# Patient Record
Sex: FEMALE | Race: WHITE | NOT HISPANIC OR LATINO | Employment: STUDENT | URBAN - METROPOLITAN AREA
[De-identification: names, ages, dates, MRNs, and addresses within clinical notes are randomized per-mention and may not be internally consistent; named-entity substitution may affect disease eponyms.]

---

## 2021-10-21 ENCOUNTER — OFFICE VISIT (OUTPATIENT)
Dept: FAMILY MEDICINE CLINIC | Facility: CLINIC | Age: 21
End: 2021-10-21

## 2021-10-21 ENCOUNTER — LAB (OUTPATIENT)
Dept: LAB | Facility: HOSPITAL | Age: 21
End: 2021-10-21

## 2021-10-21 VITALS
HEIGHT: 67 IN | SYSTOLIC BLOOD PRESSURE: 108 MMHG | HEART RATE: 94 BPM | OXYGEN SATURATION: 97 % | RESPIRATION RATE: 18 BRPM | DIASTOLIC BLOOD PRESSURE: 60 MMHG | TEMPERATURE: 97.1 F | WEIGHT: 150.6 LBS | BODY MASS INDEX: 23.64 KG/M2

## 2021-10-21 DIAGNOSIS — Z13.29 SCREENING FOR ENDOCRINE DISORDER: ICD-10-CM

## 2021-10-21 DIAGNOSIS — R74.8 ELEVATED LIVER ENZYMES: ICD-10-CM

## 2021-10-21 DIAGNOSIS — Z13.0 SCREENING FOR DEFICIENCY ANEMIA: ICD-10-CM

## 2021-10-21 DIAGNOSIS — L70.0 ACNE VULGARIS: ICD-10-CM

## 2021-10-21 DIAGNOSIS — G43.009 MIGRAINE WITHOUT AURA AND WITHOUT STATUS MIGRAINOSUS, NOT INTRACTABLE: ICD-10-CM

## 2021-10-21 DIAGNOSIS — R53.83 FATIGUE, UNSPECIFIED TYPE: Primary | ICD-10-CM

## 2021-10-21 DIAGNOSIS — R11.2 NON-INTRACTABLE VOMITING WITH NAUSEA, UNSPECIFIED VOMITING TYPE: ICD-10-CM

## 2021-10-21 DIAGNOSIS — M25.331 WRIST JOINT INSTABILITY, RIGHT: ICD-10-CM

## 2021-10-21 DIAGNOSIS — Z30.41 ORAL CONTRACEPTIVE PILL SURVEILLANCE: ICD-10-CM

## 2021-10-21 PROBLEM — Z86.59 HISTORY OF DEPRESSION: Status: ACTIVE | Noted: 2021-10-21

## 2021-10-21 PROBLEM — E55.9 VITAMIN D DEFICIENCY: Status: ACTIVE | Noted: 2021-10-21

## 2021-10-21 PROCEDURE — 99204 OFFICE O/P NEW MOD 45 MIN: CPT | Performed by: FAMILY MEDICINE

## 2021-10-21 RX ORDER — DOXYCYCLINE HYCLATE 100 MG/1
100 CAPSULE ORAL 2 TIMES DAILY
COMMUNITY
End: 2021-11-03

## 2021-10-21 RX ORDER — SPIRONOLACTONE 100 MG/1
100 TABLET, FILM COATED ORAL DAILY
COMMUNITY
End: 2022-10-06

## 2021-10-21 RX ORDER — SUMATRIPTAN 100 MG/1
100 TABLET, FILM COATED ORAL
COMMUNITY

## 2021-10-21 RX ORDER — LEVONORGESTREL / ETHINYL ESTRADIOL AND ETHINYL ESTRADIOL 150-30(84)
1 KIT ORAL DAILY
COMMUNITY
End: 2021-11-05

## 2021-10-21 RX ORDER — TRETINOIN 0.5 MG/G
1 CREAM TOPICAL NIGHTLY
COMMUNITY

## 2021-10-22 ENCOUNTER — LAB (OUTPATIENT)
Dept: LAB | Facility: HOSPITAL | Age: 21
End: 2021-10-22

## 2021-10-22 DIAGNOSIS — R53.83 FATIGUE, UNSPECIFIED TYPE: ICD-10-CM

## 2021-10-22 DIAGNOSIS — Z13.0 SCREENING FOR DEFICIENCY ANEMIA: ICD-10-CM

## 2021-10-22 DIAGNOSIS — Z13.29 SCREENING FOR ENDOCRINE DISORDER: ICD-10-CM

## 2021-10-22 DIAGNOSIS — R74.8 ELEVATED LIVER ENZYMES: ICD-10-CM

## 2021-10-22 DIAGNOSIS — R11.2 NON-INTRACTABLE VOMITING WITH NAUSEA, UNSPECIFIED VOMITING TYPE: ICD-10-CM

## 2021-10-22 LAB
B-HCG UR QL: NEGATIVE
BACTERIA UR QL AUTO: ABNORMAL /HPF
BASOPHILS # BLD AUTO: 0.03 10*3/MM3 (ref 0–0.2)
BASOPHILS NFR BLD AUTO: 0.4 % (ref 0–1.5)
BILIRUB UR QL STRIP: NEGATIVE
CLARITY UR: CLEAR
COLOR UR: YELLOW
DEPRECATED RDW RBC AUTO: 41.5 FL (ref 37–54)
EOSINOPHIL # BLD AUTO: 0.13 10*3/MM3 (ref 0–0.4)
EOSINOPHIL NFR BLD AUTO: 1.9 % (ref 0.3–6.2)
ERYTHROCYTE [DISTWIDTH] IN BLOOD BY AUTOMATED COUNT: 13.6 % (ref 12.3–15.4)
GLUCOSE UR STRIP-MCNC: NEGATIVE MG/DL
HCT VFR BLD AUTO: 40.7 % (ref 34–46.6)
HGB BLD-MCNC: 13.1 G/DL (ref 12–15.9)
HGB UR QL STRIP.AUTO: ABNORMAL
HYALINE CASTS UR QL AUTO: ABNORMAL /LPF
IMM GRANULOCYTES # BLD AUTO: 0.02 10*3/MM3 (ref 0–0.05)
IMM GRANULOCYTES NFR BLD AUTO: 0.3 % (ref 0–0.5)
KETONES UR QL STRIP: NEGATIVE
LEUKOCYTE ESTERASE UR QL STRIP.AUTO: ABNORMAL
LYMPHOCYTES # BLD AUTO: 1.31 10*3/MM3 (ref 0.7–3.1)
LYMPHOCYTES NFR BLD AUTO: 18.7 % (ref 19.6–45.3)
MCH RBC QN AUTO: 27.1 PG (ref 26.6–33)
MCHC RBC AUTO-ENTMCNC: 32.2 G/DL (ref 31.5–35.7)
MCV RBC AUTO: 84.1 FL (ref 79–97)
MONOCYTES # BLD AUTO: 0.37 10*3/MM3 (ref 0.1–0.9)
MONOCYTES NFR BLD AUTO: 5.3 % (ref 5–12)
NEUTROPHILS NFR BLD AUTO: 5.15 10*3/MM3 (ref 1.7–7)
NEUTROPHILS NFR BLD AUTO: 73.4 % (ref 42.7–76)
NITRITE UR QL STRIP: NEGATIVE
NRBC BLD AUTO-RTO: 0 /100 WBC (ref 0–0.2)
PH UR STRIP.AUTO: 5.5 [PH] (ref 5–8)
PLATELET # BLD AUTO: 380 10*3/MM3 (ref 140–450)
PMV BLD AUTO: 9.6 FL (ref 6–12)
PROT UR QL STRIP: NEGATIVE
RBC # BLD AUTO: 4.84 10*6/MM3 (ref 3.77–5.28)
RBC # UR: ABNORMAL /HPF
REF LAB TEST METHOD: ABNORMAL
SP GR UR STRIP: 1.02 (ref 1–1.03)
SQUAMOUS #/AREA URNS HPF: ABNORMAL /HPF
UROBILINOGEN UR QL STRIP: ABNORMAL
WBC # BLD AUTO: 7.01 10*3/MM3 (ref 3.4–10.8)
WBC UR QL AUTO: ABNORMAL /HPF
YEAST URNS QL MICRO: ABNORMAL /HPF

## 2021-10-22 PROCEDURE — 80053 COMPREHEN METABOLIC PANEL: CPT

## 2021-10-22 PROCEDURE — 82306 VITAMIN D 25 HYDROXY: CPT

## 2021-10-22 PROCEDURE — 82150 ASSAY OF AMYLASE: CPT

## 2021-10-22 PROCEDURE — 81025 URINE PREGNANCY TEST: CPT

## 2021-10-22 PROCEDURE — 86038 ANTINUCLEAR ANTIBODIES: CPT

## 2021-10-22 PROCEDURE — 80061 LIPID PANEL: CPT

## 2021-10-22 PROCEDURE — 84439 ASSAY OF FREE THYROXINE: CPT

## 2021-10-22 PROCEDURE — 82390 ASSAY OF CERULOPLASMIN: CPT

## 2021-10-22 PROCEDURE — 83036 HEMOGLOBIN GLYCOSYLATED A1C: CPT

## 2021-10-22 PROCEDURE — 82607 VITAMIN B-12: CPT

## 2021-10-22 PROCEDURE — 84443 ASSAY THYROID STIM HORMONE: CPT

## 2021-10-22 PROCEDURE — 84100 ASSAY OF PHOSPHORUS: CPT

## 2021-10-22 PROCEDURE — 81001 URINALYSIS AUTO W/SCOPE: CPT

## 2021-10-22 PROCEDURE — 82728 ASSAY OF FERRITIN: CPT

## 2021-10-22 PROCEDURE — 85025 COMPLETE CBC W/AUTO DIFF WBC: CPT

## 2021-10-22 PROCEDURE — 86141 C-REACTIVE PROTEIN HS: CPT

## 2021-10-23 LAB
25(OH)D3 SERPL-MCNC: 35.6 NG/ML
ALBUMIN SERPL-MCNC: 4.2 G/DL (ref 3.5–5.2)
ALBUMIN/GLOB SERPL: 1.4 G/DL
ALP SERPL-CCNC: 85 U/L (ref 39–117)
ALT SERPL W P-5'-P-CCNC: 14 U/L (ref 1–33)
AMYLASE SERPL-CCNC: 99 U/L (ref 28–100)
ANION GAP SERPL CALCULATED.3IONS-SCNC: 14.8 MMOL/L (ref 5–15)
AST SERPL-CCNC: 24 U/L (ref 1–32)
BILIRUB SERPL-MCNC: 0.2 MG/DL (ref 0–1.2)
BUN SERPL-MCNC: 10 MG/DL (ref 6–20)
BUN/CREAT SERPL: 12.5 (ref 7–25)
CALCIUM SPEC-SCNC: 9.3 MG/DL (ref 8.6–10.5)
CERULOPLASMIN SERPL-MCNC: 52 MG/DL (ref 19–39)
CHLORIDE SERPL-SCNC: 105 MMOL/L (ref 98–107)
CHOLEST SERPL-MCNC: 226 MG/DL (ref 0–200)
CO2 SERPL-SCNC: 19.2 MMOL/L (ref 22–29)
CREAT SERPL-MCNC: 0.8 MG/DL (ref 0.57–1)
CRP SERPL-MCNC: 0.41 MG/DL (ref 0.01–0.5)
FERRITIN SERPL-MCNC: 17.7 NG/ML (ref 13–150)
GFR SERPL CREATININE-BSD FRML MDRD: 91 ML/MIN/1.73
GLOBULIN UR ELPH-MCNC: 3.1 GM/DL
GLUCOSE SERPL-MCNC: 76 MG/DL (ref 65–99)
HBA1C MFR BLD: 5.44 % (ref 4.8–5.6)
HDLC SERPL-MCNC: 60 MG/DL (ref 40–60)
LDLC SERPL CALC-MCNC: 142 MG/DL (ref 0–100)
LDLC/HDLC SERPL: 2.32 {RATIO}
PHOSPHATE SERPL-MCNC: 2.9 MG/DL (ref 2.5–4.5)
POTASSIUM SERPL-SCNC: 4.3 MMOL/L (ref 3.5–5.2)
PROT SERPL-MCNC: 7.3 G/DL (ref 6–8.5)
SODIUM SERPL-SCNC: 139 MMOL/L (ref 136–145)
T4 FREE SERPL-MCNC: 1.5 NG/DL (ref 0.93–1.7)
TRIGL SERPL-MCNC: 134 MG/DL (ref 0–150)
TSH SERPL DL<=0.05 MIU/L-ACNC: 1.49 UIU/ML (ref 0.27–4.2)
VIT B12 BLD-MCNC: 425 PG/ML (ref 211–946)
VLDLC SERPL-MCNC: 24 MG/DL (ref 5–40)

## 2021-10-25 LAB — ANA SER QL: NEGATIVE

## 2021-11-03 ENCOUNTER — OFFICE VISIT (OUTPATIENT)
Dept: OBSTETRICS AND GYNECOLOGY | Facility: CLINIC | Age: 21
End: 2021-11-03

## 2021-11-03 VITALS
DIASTOLIC BLOOD PRESSURE: 78 MMHG | WEIGHT: 149 LBS | SYSTOLIC BLOOD PRESSURE: 110 MMHG | HEIGHT: 67 IN | BODY MASS INDEX: 23.39 KG/M2

## 2021-11-03 DIAGNOSIS — Z01.419 PAP TEST, AS PART OF ROUTINE GYNECOLOGICAL EXAMINATION: Primary | ICD-10-CM

## 2021-11-03 DIAGNOSIS — Z30.014 ENCOUNTER FOR INITIAL PRESCRIPTION OF INTRAUTERINE CONTRACEPTIVE DEVICE (IUD): ICD-10-CM

## 2021-11-03 PROCEDURE — 99385 PREV VISIT NEW AGE 18-39: CPT | Performed by: OBSTETRICS & GYNECOLOGY

## 2021-11-05 ENCOUNTER — OFFICE VISIT (OUTPATIENT)
Dept: FAMILY MEDICINE CLINIC | Facility: CLINIC | Age: 21
End: 2021-11-05

## 2021-11-05 VITALS
OXYGEN SATURATION: 98 % | HEART RATE: 82 BPM | BODY MASS INDEX: 23.76 KG/M2 | TEMPERATURE: 97.1 F | RESPIRATION RATE: 18 BRPM | DIASTOLIC BLOOD PRESSURE: 60 MMHG | HEIGHT: 67 IN | WEIGHT: 151.4 LBS | SYSTOLIC BLOOD PRESSURE: 110 MMHG

## 2021-11-05 DIAGNOSIS — R53.83 FATIGUE, UNSPECIFIED TYPE: Primary | ICD-10-CM

## 2021-11-05 DIAGNOSIS — R74.8 ELEVATED LIVER ENZYMES: ICD-10-CM

## 2021-11-05 PROBLEM — Z30.8 ENCOUNTER FOR OTHER CONTRACEPTIVE MANAGEMENT: Status: ACTIVE | Noted: 2021-10-21

## 2021-11-05 PROCEDURE — 99213 OFFICE O/P EST LOW 20 MIN: CPT | Performed by: FAMILY MEDICINE

## 2021-11-08 ENCOUNTER — LAB (OUTPATIENT)
Dept: LAB | Facility: HOSPITAL | Age: 21
End: 2021-11-08

## 2021-11-08 DIAGNOSIS — R74.8 ELEVATED LIVER ENZYMES: ICD-10-CM

## 2021-11-08 DIAGNOSIS — Z01.419 PAP TEST, AS PART OF ROUTINE GYNECOLOGICAL EXAMINATION: ICD-10-CM

## 2021-11-08 LAB
ALBUMIN SERPL-MCNC: 3.9 G/DL (ref 3.5–5.2)
ALP SERPL-CCNC: 94 U/L (ref 39–117)
ALT SERPL W P-5'-P-CCNC: 17 U/L (ref 1–33)
AST SERPL-CCNC: 24 U/L (ref 1–32)
BILIRUB CONJ SERPL-MCNC: <0.2 MG/DL (ref 0–0.3)
BILIRUB INDIRECT SERPL-MCNC: NORMAL MG/DL
BILIRUB SERPL-MCNC: 0.3 MG/DL (ref 0–1.2)
CERULOPLASMIN SERPL-MCNC: 36 MG/DL (ref 19–39)
PROT SERPL-MCNC: 7.2 G/DL (ref 6–8.5)

## 2021-11-08 PROCEDURE — 82390 ASSAY OF CERULOPLASMIN: CPT

## 2021-11-08 PROCEDURE — 80076 HEPATIC FUNCTION PANEL: CPT

## 2021-11-11 ENCOUNTER — DOCUMENTATION (OUTPATIENT)
Dept: OBSTETRICS AND GYNECOLOGY | Facility: CLINIC | Age: 21
End: 2021-11-11

## 2022-08-15 ENCOUNTER — TELEPHONE (OUTPATIENT)
Dept: FAMILY MEDICINE CLINIC | Facility: CLINIC | Age: 22
End: 2022-08-15

## 2022-08-15 DIAGNOSIS — Z11.1 SCREENING FOR TUBERCULOSIS: Primary | ICD-10-CM

## 2022-10-06 ENCOUNTER — LAB (OUTPATIENT)
Dept: LAB | Facility: HOSPITAL | Age: 22
End: 2022-10-06

## 2022-10-06 ENCOUNTER — OFFICE VISIT (OUTPATIENT)
Dept: FAMILY MEDICINE CLINIC | Facility: CLINIC | Age: 22
End: 2022-10-06

## 2022-10-06 ENCOUNTER — HOSPITAL ENCOUNTER (OUTPATIENT)
Dept: GENERAL RADIOLOGY | Facility: HOSPITAL | Age: 22
Discharge: HOME | End: 2022-10-06

## 2022-10-06 VITALS
WEIGHT: 139 LBS | HEART RATE: 94 BPM | DIASTOLIC BLOOD PRESSURE: 60 MMHG | TEMPERATURE: 98 F | SYSTOLIC BLOOD PRESSURE: 80 MMHG | BODY MASS INDEX: 21.82 KG/M2 | OXYGEN SATURATION: 99 %

## 2022-10-06 DIAGNOSIS — R05.1 ACUTE COUGH: ICD-10-CM

## 2022-10-06 DIAGNOSIS — I95.9 HYPOTENSION, UNSPECIFIED HYPOTENSION TYPE: ICD-10-CM

## 2022-10-06 DIAGNOSIS — Z11.1 SCREENING FOR TUBERCULOSIS: ICD-10-CM

## 2022-10-06 DIAGNOSIS — R05.1 ACUTE COUGH: Primary | ICD-10-CM

## 2022-10-06 LAB — D-LACTATE SERPL-SCNC: 1.4 MMOL/L (ref 0.5–2)

## 2022-10-06 PROCEDURE — 87040 BLOOD CULTURE FOR BACTERIA: CPT

## 2022-10-06 PROCEDURE — 71046 X-RAY EXAM CHEST 2 VIEWS: CPT

## 2022-10-06 PROCEDURE — 83605 ASSAY OF LACTIC ACID: CPT

## 2022-10-06 PROCEDURE — 99214 OFFICE O/P EST MOD 30 MIN: CPT | Performed by: FAMILY MEDICINE

## 2022-10-06 PROCEDURE — 36415 COLL VENOUS BLD VENIPUNCTURE: CPT

## 2022-10-06 PROCEDURE — 85025 COMPLETE CBC W/AUTO DIFF WBC: CPT

## 2022-10-06 PROCEDURE — 86480 TB TEST CELL IMMUN MEASURE: CPT

## 2022-10-06 PROCEDURE — 80053 COMPREHEN METABOLIC PANEL: CPT

## 2022-10-06 RX ORDER — DOXYCYCLINE 100 MG/1
100 TABLET ORAL 2 TIMES DAILY
Qty: 20 TABLET | Refills: 0 | Status: SHIPPED | OUTPATIENT
Start: 2022-10-06 | End: 2022-10-16

## 2022-10-06 RX ORDER — BROMPHENIRAMINE MALEATE, PSEUDOEPHEDRINE HYDROCHLORIDE, AND DEXTROMETHORPHAN HYDROBROMIDE 2; 30; 10 MG/5ML; MG/5ML; MG/5ML
5 SYRUP ORAL 4 TIMES DAILY PRN
Qty: 118 ML | Refills: 1 | Status: SHIPPED | OUTPATIENT
Start: 2022-10-06 | End: 2022-10-13

## 2022-10-07 LAB
ALBUMIN SERPL-MCNC: 4.3 G/DL (ref 3.5–5.2)
ALBUMIN/GLOB SERPL: 1.5 G/DL
ALP SERPL-CCNC: 82 U/L (ref 39–117)
ALT SERPL W P-5'-P-CCNC: 12 U/L (ref 1–33)
ANION GAP SERPL CALCULATED.3IONS-SCNC: 9.8 MMOL/L (ref 5–15)
AST SERPL-CCNC: 20 U/L (ref 1–32)
BASOPHILS # BLD AUTO: 0.04 10*3/MM3 (ref 0–0.2)
BASOPHILS NFR BLD AUTO: 0.6 % (ref 0–1.5)
BILIRUB SERPL-MCNC: 0.3 MG/DL (ref 0–1.2)
BUN SERPL-MCNC: 11 MG/DL (ref 6–20)
BUN/CREAT SERPL: 13.6 (ref 7–25)
CALCIUM SPEC-SCNC: 9.9 MG/DL (ref 8.6–10.5)
CHLORIDE SERPL-SCNC: 101 MMOL/L (ref 98–107)
CO2 SERPL-SCNC: 28.2 MMOL/L (ref 22–29)
CREAT SERPL-MCNC: 0.81 MG/DL (ref 0.57–1)
DEPRECATED RDW RBC AUTO: 38.2 FL (ref 37–54)
EGFRCR SERPLBLD CKD-EPI 2021: 105.4 ML/MIN/1.73
EOSINOPHIL # BLD AUTO: 0.18 10*3/MM3 (ref 0–0.4)
EOSINOPHIL NFR BLD AUTO: 2.8 % (ref 0.3–6.2)
ERYTHROCYTE [DISTWIDTH] IN BLOOD BY AUTOMATED COUNT: 12.8 % (ref 12.3–15.4)
GLOBULIN UR ELPH-MCNC: 2.9 GM/DL
GLUCOSE SERPL-MCNC: 83 MG/DL (ref 65–99)
HCT VFR BLD AUTO: 36.7 % (ref 34–46.6)
HGB BLD-MCNC: 12.5 G/DL (ref 12–15.9)
IMM GRANULOCYTES # BLD AUTO: 0.03 10*3/MM3 (ref 0–0.05)
IMM GRANULOCYTES NFR BLD AUTO: 0.5 % (ref 0–0.5)
LYMPHOCYTES # BLD AUTO: 1.37 10*3/MM3 (ref 0.7–3.1)
LYMPHOCYTES NFR BLD AUTO: 20.9 % (ref 19.6–45.3)
MCH RBC QN AUTO: 28 PG (ref 26.6–33)
MCHC RBC AUTO-ENTMCNC: 34.1 G/DL (ref 31.5–35.7)
MCV RBC AUTO: 82.3 FL (ref 79–97)
MONOCYTES # BLD AUTO: 0.43 10*3/MM3 (ref 0.1–0.9)
MONOCYTES NFR BLD AUTO: 6.6 % (ref 5–12)
NEUTROPHILS NFR BLD AUTO: 4.49 10*3/MM3 (ref 1.7–7)
NEUTROPHILS NFR BLD AUTO: 68.6 % (ref 42.7–76)
NRBC BLD AUTO-RTO: 0 /100 WBC (ref 0–0.2)
PLATELET # BLD AUTO: 317 10*3/MM3 (ref 140–450)
PMV BLD AUTO: 9.3 FL (ref 6–12)
POTASSIUM SERPL-SCNC: 4.2 MMOL/L (ref 3.5–5.2)
PROT SERPL-MCNC: 7.2 G/DL (ref 6–8.5)
RBC # BLD AUTO: 4.46 10*6/MM3 (ref 3.77–5.28)
SODIUM SERPL-SCNC: 139 MMOL/L (ref 136–145)
WBC NRBC COR # BLD: 6.54 10*3/MM3 (ref 3.4–10.8)

## 2022-10-11 LAB
BACTERIA SPEC AEROBE CULT: NORMAL
GAMMA INTERFERON BACKGROUND BLD IA-ACNC: 0.05 IU/ML
M TB IFN-G BLD-IMP: NEGATIVE
M TB IFN-G CD4+ BCKGRND COR BLD-ACNC: 0.13 IU/ML
M TB IFN-G CD4+CD8+ BCKGRND COR BLD-ACNC: 0.16 IU/ML
MITOGEN IGNF BLD-ACNC: 9.06 IU/ML
QUANTIFERON INCUBATION: NORMAL
SERVICE CMNT-IMP: NORMAL

## 2022-10-19 ENCOUNTER — PATIENT MESSAGE (OUTPATIENT)
Dept: FAMILY MEDICINE CLINIC | Facility: CLINIC | Age: 22
End: 2022-10-19

## 2022-10-19 DIAGNOSIS — R05.3 PERSISTENT COUGH: Primary | ICD-10-CM

## 2022-10-28 ENCOUNTER — CLINICAL SUPPORT (OUTPATIENT)
Dept: FAMILY MEDICINE CLINIC | Facility: CLINIC | Age: 22
End: 2022-10-28

## 2022-10-28 DIAGNOSIS — Z23 IMMUNIZATION DUE: Primary | ICD-10-CM

## 2022-10-28 PROCEDURE — 90471 IMMUNIZATION ADMIN: CPT | Performed by: INTERNAL MEDICINE

## 2022-10-28 PROCEDURE — 90686 IIV4 VACC NO PRSV 0.5 ML IM: CPT | Performed by: INTERNAL MEDICINE

## 2023-02-17 ENCOUNTER — OFFICE VISIT (OUTPATIENT)
Dept: FAMILY MEDICINE CLINIC | Facility: CLINIC | Age: 23
End: 2023-02-17
Payer: COMMERCIAL

## 2023-02-17 VITALS
SYSTOLIC BLOOD PRESSURE: 108 MMHG | TEMPERATURE: 98.6 F | DIASTOLIC BLOOD PRESSURE: 74 MMHG | HEIGHT: 67 IN | HEART RATE: 100 BPM | BODY MASS INDEX: 21.38 KG/M2 | OXYGEN SATURATION: 99 % | WEIGHT: 136.2 LBS

## 2023-02-17 DIAGNOSIS — R41.840 ATTENTION DEFICIT: Primary | ICD-10-CM

## 2023-02-17 PROCEDURE — 99213 OFFICE O/P EST LOW 20 MIN: CPT | Performed by: FAMILY MEDICINE

## 2023-02-17 RX ORDER — GUANFACINE 1 MG/1
1 TABLET, EXTENDED RELEASE ORAL NIGHTLY
Qty: 30 TABLET | Refills: 0 | Status: SHIPPED | OUTPATIENT
Start: 2023-02-17 | End: 2023-03-21 | Stop reason: DRUGHIGH

## 2023-03-21 ENCOUNTER — TELEMEDICINE (OUTPATIENT)
Dept: FAMILY MEDICINE CLINIC | Facility: CLINIC | Age: 23
End: 2023-03-21
Payer: COMMERCIAL

## 2023-03-21 DIAGNOSIS — R41.840 ATTENTION DEFICIT: ICD-10-CM

## 2023-03-21 PROCEDURE — 99213 OFFICE O/P EST LOW 20 MIN: CPT | Performed by: FAMILY MEDICINE

## 2023-03-21 RX ORDER — GUANFACINE 2 MG/1
2 TABLET, EXTENDED RELEASE ORAL NIGHTLY
Qty: 30 TABLET | Refills: 0 | Status: SHIPPED | OUTPATIENT
Start: 2023-03-21

## 2023-04-11 ENCOUNTER — PATIENT MESSAGE (OUTPATIENT)
Dept: FAMILY MEDICINE CLINIC | Facility: CLINIC | Age: 23
End: 2023-04-11
Payer: COMMERCIAL

## 2023-04-18 ENCOUNTER — TELEPHONE (OUTPATIENT)
Dept: FAMILY MEDICINE CLINIC | Facility: CLINIC | Age: 23
End: 2023-04-18

## 2023-04-18 ENCOUNTER — OFFICE VISIT (OUTPATIENT)
Dept: FAMILY MEDICINE CLINIC | Facility: CLINIC | Age: 23
End: 2023-04-18
Payer: COMMERCIAL

## 2023-04-18 VITALS
OXYGEN SATURATION: 96 % | TEMPERATURE: 97.5 F | WEIGHT: 128 LBS | DIASTOLIC BLOOD PRESSURE: 62 MMHG | BODY MASS INDEX: 20.09 KG/M2 | SYSTOLIC BLOOD PRESSURE: 98 MMHG | HEIGHT: 67 IN | HEART RATE: 66 BPM

## 2023-04-18 DIAGNOSIS — F90.1 ATTENTION DEFICIT HYPERACTIVITY DISORDER (ADHD), PREDOMINANTLY HYPERACTIVE TYPE: Primary | ICD-10-CM

## 2023-04-18 PROCEDURE — 99214 OFFICE O/P EST MOD 30 MIN: CPT | Performed by: FAMILY MEDICINE

## 2023-04-18 RX ORDER — DEXTROAMPHETAMINE SACCHARATE, AMPHETAMINE ASPARTATE MONOHYDRATE, DEXTROAMPHETAMINE SULFATE AND AMPHETAMINE SULFATE 2.5; 2.5; 2.5; 2.5 MG/1; MG/1; MG/1; MG/1
10 CAPSULE, EXTENDED RELEASE ORAL EVERY MORNING
Qty: 30 CAPSULE | Refills: 0 | Status: SHIPPED | OUTPATIENT
Start: 2023-04-18

## 2023-04-21 ENCOUNTER — TELEPHONE (OUTPATIENT)
Dept: FAMILY MEDICINE CLINIC | Facility: CLINIC | Age: 23
End: 2023-04-21
Payer: COMMERCIAL

## 2023-04-21 DIAGNOSIS — F90.1 ATTENTION DEFICIT HYPERACTIVITY DISORDER (ADHD), PREDOMINANTLY HYPERACTIVE TYPE: Primary | ICD-10-CM

## 2023-04-26 RX ORDER — METHYLPHENIDATE HYDROCHLORIDE 10 MG/1
10 TABLET ORAL 2 TIMES DAILY
Qty: 60 TABLET | Refills: 0 | Status: SHIPPED | OUTPATIENT
Start: 2023-04-26

## 2023-05-09 DIAGNOSIS — F90.1 ATTENTION DEFICIT HYPERACTIVITY DISORDER (ADHD), PREDOMINANTLY HYPERACTIVE TYPE: ICD-10-CM

## 2023-05-10 RX ORDER — METHYLPHENIDATE HYDROCHLORIDE 10 MG/1
10 TABLET ORAL 2 TIMES DAILY
Qty: 60 TABLET | Refills: 0 | OUTPATIENT
Start: 2023-05-10

## 2023-05-12 ENCOUNTER — TELEPHONE (OUTPATIENT)
Dept: FAMILY MEDICINE CLINIC | Facility: CLINIC | Age: 23
End: 2023-05-12
Payer: COMMERCIAL

## 2023-05-16 DIAGNOSIS — F90.1 ATTENTION DEFICIT HYPERACTIVITY DISORDER (ADHD), PREDOMINANTLY HYPERACTIVE TYPE: ICD-10-CM

## 2023-05-17 RX ORDER — METHYLPHENIDATE HYDROCHLORIDE 10 MG/1
10 TABLET ORAL 2 TIMES DAILY
Qty: 60 TABLET | Refills: 0 | Status: SHIPPED | OUTPATIENT
Start: 2023-05-17

## 2023-08-14 DIAGNOSIS — F90.1 ATTENTION DEFICIT HYPERACTIVITY DISORDER (ADHD), PREDOMINANTLY HYPERACTIVE TYPE: ICD-10-CM

## 2023-08-15 ENCOUNTER — OFFICE VISIT (OUTPATIENT)
Dept: OBSTETRICS AND GYNECOLOGY | Facility: CLINIC | Age: 23
End: 2023-08-15
Payer: COMMERCIAL

## 2023-08-15 VITALS
SYSTOLIC BLOOD PRESSURE: 120 MMHG | WEIGHT: 120 LBS | BODY MASS INDEX: 19.29 KG/M2 | HEIGHT: 66 IN | DIASTOLIC BLOOD PRESSURE: 68 MMHG

## 2023-08-15 DIAGNOSIS — L02.214 INGUINAL ABSCESS: ICD-10-CM

## 2023-08-15 DIAGNOSIS — Z01.419 WOMEN'S ANNUAL ROUTINE GYNECOLOGICAL EXAMINATION: Primary | ICD-10-CM

## 2023-08-15 DIAGNOSIS — Z01.419 PAP TEST, AS PART OF ROUTINE GYNECOLOGICAL EXAMINATION: ICD-10-CM

## 2023-08-15 RX ORDER — METHYLPHENIDATE HYDROCHLORIDE 10 MG/1
10 TABLET ORAL 2 TIMES DAILY
Qty: 60 TABLET | Refills: 0 | Status: SHIPPED | OUTPATIENT
Start: 2023-08-15

## 2023-08-15 RX ORDER — CEPHALEXIN 500 MG/1
500 CAPSULE ORAL 4 TIMES DAILY
Qty: 28 CAPSULE | Refills: 0 | Status: SHIPPED | OUTPATIENT
Start: 2023-08-15 | End: 2023-08-22

## 2023-08-15 RX ORDER — SPIRONOLACTONE 50 MG/1
TABLET, FILM COATED ORAL
COMMUNITY
Start: 2023-07-22

## 2023-08-18 LAB — REF LAB TEST METHOD: NORMAL

## 2023-10-12 DIAGNOSIS — F90.1 ATTENTION DEFICIT HYPERACTIVITY DISORDER (ADHD), PREDOMINANTLY HYPERACTIVE TYPE: ICD-10-CM

## 2023-10-17 RX ORDER — METHYLPHENIDATE HYDROCHLORIDE 10 MG/1
10 TABLET ORAL 2 TIMES DAILY
Qty: 60 TABLET | Refills: 0 | Status: SHIPPED | OUTPATIENT
Start: 2023-10-17

## 2023-12-16 ENCOUNTER — TELEPHONE (OUTPATIENT)
Dept: FAMILY MEDICINE CLINIC | Facility: CLINIC | Age: 23
End: 2023-12-16
Payer: COMMERCIAL

## 2024-01-09 ENCOUNTER — LAB (OUTPATIENT)
Dept: LAB | Facility: HOSPITAL | Age: 24
End: 2024-01-09
Payer: COMMERCIAL

## 2024-01-09 ENCOUNTER — OFFICE VISIT (OUTPATIENT)
Dept: FAMILY MEDICINE CLINIC | Facility: CLINIC | Age: 24
End: 2024-01-09
Payer: COMMERCIAL

## 2024-01-09 VITALS
HEIGHT: 66 IN | WEIGHT: 123 LBS | DIASTOLIC BLOOD PRESSURE: 68 MMHG | BODY MASS INDEX: 19.77 KG/M2 | SYSTOLIC BLOOD PRESSURE: 108 MMHG

## 2024-01-09 DIAGNOSIS — Z13.29 SCREENING FOR ENDOCRINE DISORDER: ICD-10-CM

## 2024-01-09 DIAGNOSIS — Z13.220 SCREENING FOR HYPERLIPIDEMIA: ICD-10-CM

## 2024-01-09 DIAGNOSIS — Z13.0 SCREENING FOR DEFICIENCY ANEMIA: ICD-10-CM

## 2024-01-09 DIAGNOSIS — F90.1 ATTENTION DEFICIT HYPERACTIVITY DISORDER (ADHD), PREDOMINANTLY HYPERACTIVE TYPE: ICD-10-CM

## 2024-01-09 DIAGNOSIS — G43.009 MIGRAINE WITHOUT AURA AND WITHOUT STATUS MIGRAINOSUS, NOT INTRACTABLE: Primary | ICD-10-CM

## 2024-01-09 DIAGNOSIS — R53.83 OTHER FATIGUE: ICD-10-CM

## 2024-01-09 LAB
BACTERIA UR QL AUTO: ABNORMAL /HPF
BASOPHILS # BLD AUTO: 0.03 10*3/MM3 (ref 0–0.2)
BASOPHILS NFR BLD AUTO: 0.8 % (ref 0–1.5)
BILIRUB UR QL STRIP: NEGATIVE
CLARITY UR: CLEAR
COLOR UR: YELLOW
DEPRECATED RDW RBC AUTO: 38 FL (ref 37–54)
EOSINOPHIL # BLD AUTO: 0.2 10*3/MM3 (ref 0–0.4)
EOSINOPHIL NFR BLD AUTO: 5 % (ref 0.3–6.2)
ERYTHROCYTE [DISTWIDTH] IN BLOOD BY AUTOMATED COUNT: 12.5 % (ref 12.3–15.4)
GLUCOSE UR STRIP-MCNC: NEGATIVE MG/DL
HBA1C MFR BLD: 5.4 % (ref 4.8–5.6)
HCT VFR BLD AUTO: 34.9 % (ref 34–46.6)
HGB BLD-MCNC: 11.7 G/DL (ref 12–15.9)
HGB UR QL STRIP.AUTO: ABNORMAL
HYALINE CASTS UR QL AUTO: ABNORMAL /LPF
IMM GRANULOCYTES # BLD AUTO: 0.02 10*3/MM3 (ref 0–0.05)
IMM GRANULOCYTES NFR BLD AUTO: 0.5 % (ref 0–0.5)
KETONES UR QL STRIP: NEGATIVE
LEUKOCYTE ESTERASE UR QL STRIP.AUTO: NEGATIVE
LYMPHOCYTES # BLD AUTO: 0.96 10*3/MM3 (ref 0.7–3.1)
LYMPHOCYTES NFR BLD AUTO: 24.1 % (ref 19.6–45.3)
MCH RBC QN AUTO: 28.4 PG (ref 26.6–33)
MCHC RBC AUTO-ENTMCNC: 33.5 G/DL (ref 31.5–35.7)
MCV RBC AUTO: 84.7 FL (ref 79–97)
MONOCYTES # BLD AUTO: 0.3 10*3/MM3 (ref 0.1–0.9)
MONOCYTES NFR BLD AUTO: 7.5 % (ref 5–12)
NEUTROPHILS NFR BLD AUTO: 2.47 10*3/MM3 (ref 1.7–7)
NEUTROPHILS NFR BLD AUTO: 62.1 % (ref 42.7–76)
NITRITE UR QL STRIP: NEGATIVE
NRBC BLD AUTO-RTO: 0 /100 WBC (ref 0–0.2)
PH UR STRIP.AUTO: 6 [PH] (ref 5–8)
PLATELET # BLD AUTO: 301 10*3/MM3 (ref 140–450)
PMV BLD AUTO: 9.1 FL (ref 6–12)
PROT UR QL STRIP: NEGATIVE
RBC # BLD AUTO: 4.12 10*6/MM3 (ref 3.77–5.28)
RBC # UR STRIP: ABNORMAL /HPF
REF LAB TEST METHOD: ABNORMAL
SP GR UR STRIP: 1.02 (ref 1–1.03)
SQUAMOUS #/AREA URNS HPF: ABNORMAL /HPF
UROBILINOGEN UR QL STRIP: ABNORMAL
WBC # UR STRIP: ABNORMAL /HPF
WBC NRBC COR # BLD AUTO: 3.98 10*3/MM3 (ref 3.4–10.8)

## 2024-01-09 PROCEDURE — 80061 LIPID PANEL: CPT | Performed by: FAMILY MEDICINE

## 2024-01-09 PROCEDURE — 82306 VITAMIN D 25 HYDROXY: CPT | Performed by: FAMILY MEDICINE

## 2024-01-09 PROCEDURE — 81001 URINALYSIS AUTO W/SCOPE: CPT | Performed by: FAMILY MEDICINE

## 2024-01-09 PROCEDURE — 84100 ASSAY OF PHOSPHORUS: CPT | Performed by: FAMILY MEDICINE

## 2024-01-09 PROCEDURE — 82746 ASSAY OF FOLIC ACID SERUM: CPT | Performed by: FAMILY MEDICINE

## 2024-01-09 PROCEDURE — 83036 HEMOGLOBIN GLYCOSYLATED A1C: CPT | Performed by: FAMILY MEDICINE

## 2024-01-09 PROCEDURE — 85025 COMPLETE CBC W/AUTO DIFF WBC: CPT | Performed by: FAMILY MEDICINE

## 2024-01-09 PROCEDURE — 84466 ASSAY OF TRANSFERRIN: CPT | Performed by: FAMILY MEDICINE

## 2024-01-09 PROCEDURE — 82180 ASSAY OF ASCORBIC ACID: CPT | Performed by: FAMILY MEDICINE

## 2024-01-09 PROCEDURE — 99214 OFFICE O/P EST MOD 30 MIN: CPT | Performed by: FAMILY MEDICINE

## 2024-01-09 PROCEDURE — 80053 COMPREHEN METABOLIC PANEL: CPT | Performed by: FAMILY MEDICINE

## 2024-01-09 PROCEDURE — 82607 VITAMIN B-12: CPT | Performed by: FAMILY MEDICINE

## 2024-01-09 PROCEDURE — 84591 ASSAY OF NOS VITAMIN: CPT | Performed by: FAMILY MEDICINE

## 2024-01-09 PROCEDURE — 84443 ASSAY THYROID STIM HORMONE: CPT | Performed by: FAMILY MEDICINE

## 2024-01-09 PROCEDURE — 83735 ASSAY OF MAGNESIUM: CPT | Performed by: FAMILY MEDICINE

## 2024-01-09 PROCEDURE — 82728 ASSAY OF FERRITIN: CPT | Performed by: FAMILY MEDICINE

## 2024-01-09 PROCEDURE — 83540 ASSAY OF IRON: CPT | Performed by: FAMILY MEDICINE

## 2024-01-09 RX ORDER — METHYLPHENIDATE HYDROCHLORIDE 10 MG/1
10 TABLET ORAL 2 TIMES DAILY
Qty: 180 TABLET | Refills: 0 | Status: SHIPPED | OUTPATIENT
Start: 2024-01-09

## 2024-01-09 RX ORDER — SUMATRIPTAN 100 MG/1
100 TABLET, FILM COATED ORAL
Qty: 9 TABLET | Refills: 11 | Status: SHIPPED | OUTPATIENT
Start: 2024-01-09

## 2024-01-10 LAB
25(OH)D3 SERPL-MCNC: 26.3 NG/ML (ref 30–100)
ALBUMIN SERPL-MCNC: 4 G/DL (ref 3.5–5.2)
ALBUMIN/GLOB SERPL: 1.6 G/DL
ALP SERPL-CCNC: 65 U/L (ref 39–117)
ALT SERPL W P-5'-P-CCNC: 19 U/L (ref 1–33)
ANION GAP SERPL CALCULATED.3IONS-SCNC: 8.8 MMOL/L (ref 5–15)
AST SERPL-CCNC: 19 U/L (ref 1–32)
BILIRUB SERPL-MCNC: <0.2 MG/DL (ref 0–1.2)
BUN SERPL-MCNC: 11 MG/DL (ref 6–20)
BUN/CREAT SERPL: 13.6 (ref 7–25)
CALCIUM SPEC-SCNC: 9.3 MG/DL (ref 8.6–10.5)
CHLORIDE SERPL-SCNC: 108 MMOL/L (ref 98–107)
CHOLEST SERPL-MCNC: 160 MG/DL (ref 0–200)
CO2 SERPL-SCNC: 22.2 MMOL/L (ref 22–29)
CREAT SERPL-MCNC: 0.81 MG/DL (ref 0.57–1)
EGFRCR SERPLBLD CKD-EPI 2021: 104.8 ML/MIN/1.73
FERRITIN SERPL-MCNC: 19.1 NG/ML (ref 13–150)
FOLATE SERPL-MCNC: 7.02 NG/ML (ref 4.78–24.2)
GLOBULIN UR ELPH-MCNC: 2.5 GM/DL
GLUCOSE SERPL-MCNC: 81 MG/DL (ref 65–99)
HDLC SERPL-MCNC: 60 MG/DL (ref 40–60)
IRON 24H UR-MRATE: 42 MCG/DL (ref 37–145)
IRON SATN MFR SERPL: 10 % (ref 20–50)
LDLC SERPL CALC-MCNC: 88 MG/DL (ref 0–100)
LDLC/HDLC SERPL: 1.47 {RATIO}
MAGNESIUM SERPL-MCNC: 1.9 MG/DL (ref 1.6–2.6)
PHOSPHATE SERPL-MCNC: 3.3 MG/DL (ref 2.5–4.5)
POTASSIUM SERPL-SCNC: 4.5 MMOL/L (ref 3.5–5.2)
PROT SERPL-MCNC: 6.5 G/DL (ref 6–8.5)
SODIUM SERPL-SCNC: 139 MMOL/L (ref 136–145)
TIBC SERPL-MCNC: 404 MCG/DL (ref 298–536)
TRANSFERRIN SERPL-MCNC: 271 MG/DL (ref 200–360)
TRIGL SERPL-MCNC: 60 MG/DL (ref 0–150)
TSH SERPL DL<=0.05 MIU/L-ACNC: 2.81 UIU/ML (ref 0.27–4.2)
VIT B12 BLD-MCNC: 694 PG/ML (ref 211–946)
VLDLC SERPL-MCNC: 12 MG/DL (ref 5–40)

## 2024-01-14 LAB
BIOTIN SERPL-MCNC: <0.05 NG/ML (ref 0.05–0.83)
VIT C SERPL-MCNC: 0.8 MG/DL (ref 0.4–2)

## 2024-02-12 DIAGNOSIS — F90.1 ATTENTION DEFICIT HYPERACTIVITY DISORDER (ADHD), PREDOMINANTLY HYPERACTIVE TYPE: ICD-10-CM

## 2024-02-12 RX ORDER — METHYLPHENIDATE HYDROCHLORIDE 10 MG/1
10 TABLET ORAL 2 TIMES DAILY
Qty: 180 TABLET | Refills: 0 | Status: CANCELLED | OUTPATIENT
Start: 2024-02-12

## 2024-02-14 RX ORDER — METHYLPHENIDATE HYDROCHLORIDE 10 MG/1
10 TABLET ORAL 2 TIMES DAILY
Qty: 60 TABLET | Refills: 0 | Status: SHIPPED | OUTPATIENT
Start: 2024-02-14

## 2024-04-08 DIAGNOSIS — F90.1 ATTENTION DEFICIT HYPERACTIVITY DISORDER (ADHD), PREDOMINANTLY HYPERACTIVE TYPE: ICD-10-CM

## 2024-04-08 RX ORDER — METHYLPHENIDATE HYDROCHLORIDE 10 MG/1
10 TABLET ORAL 2 TIMES DAILY
Qty: 60 TABLET | Refills: 0 | Status: SHIPPED | OUTPATIENT
Start: 2024-04-08 | End: 2024-04-13 | Stop reason: SDUPTHER

## 2024-04-09 ENCOUNTER — TELEMEDICINE (OUTPATIENT)
Dept: FAMILY MEDICINE CLINIC | Facility: CLINIC | Age: 24
End: 2024-04-09
Payer: COMMERCIAL

## 2024-04-09 DIAGNOSIS — F90.1 ATTENTION DEFICIT HYPERACTIVITY DISORDER (ADHD), PREDOMINANTLY HYPERACTIVE TYPE: Primary | ICD-10-CM

## 2024-04-09 DIAGNOSIS — E55.9 VITAMIN D DEFICIENCY: ICD-10-CM

## 2024-04-09 PROCEDURE — 99213 OFFICE O/P EST LOW 20 MIN: CPT | Performed by: FAMILY MEDICINE

## 2024-04-13 PROBLEM — R74.8 ELEVATED LIVER ENZYMES: Status: RESOLVED | Noted: 2021-10-21 | Resolved: 2024-04-13

## 2024-04-13 RX ORDER — METHYLPHENIDATE HYDROCHLORIDE 10 MG/1
10 TABLET ORAL 2 TIMES DAILY
Qty: 60 TABLET | Refills: 0 | Status: SHIPPED | OUTPATIENT
Start: 2024-06-04 | End: 2024-07-04

## 2024-04-13 RX ORDER — ERGOCALCIFEROL 1.25 MG/1
50000 CAPSULE ORAL WEEKLY
Qty: 12 CAPSULE | Refills: 0 | Status: SHIPPED | OUTPATIENT
Start: 2024-04-13 | End: 2024-06-30

## 2024-04-13 RX ORDER — METHYLPHENIDATE HYDROCHLORIDE 10 MG/1
10 TABLET ORAL 2 TIMES DAILY
Qty: 60 TABLET | Refills: 0 | Status: SHIPPED | OUTPATIENT
Start: 2024-05-07 | End: 2024-06-06

## 2024-07-15 DIAGNOSIS — F90.1 ATTENTION DEFICIT HYPERACTIVITY DISORDER (ADHD), PREDOMINANTLY HYPERACTIVE TYPE: ICD-10-CM

## 2024-07-15 RX ORDER — METHYLPHENIDATE HYDROCHLORIDE 10 MG/1
10 TABLET ORAL 2 TIMES DAILY
Qty: 60 TABLET | Refills: 0 | Status: SHIPPED | OUTPATIENT
Start: 2024-07-15 | End: 2024-08-14

## 2024-09-05 DIAGNOSIS — F90.1 ATTENTION DEFICIT HYPERACTIVITY DISORDER (ADHD), PREDOMINANTLY HYPERACTIVE TYPE: ICD-10-CM

## 2024-09-06 RX ORDER — METHYLPHENIDATE HYDROCHLORIDE 10 MG/1
10 TABLET ORAL 2 TIMES DAILY
Qty: 60 TABLET | Refills: 0 | Status: SHIPPED | OUTPATIENT
Start: 2024-09-06 | End: 2024-10-06

## 2024-10-11 DIAGNOSIS — E55.9 VITAMIN D DEFICIENCY: ICD-10-CM

## 2024-10-11 RX ORDER — ERGOCALCIFEROL 1.25 MG/1
CAPSULE, LIQUID FILLED ORAL
Qty: 12 CAPSULE | Refills: 0 | Status: SHIPPED | OUTPATIENT
Start: 2024-10-11

## 2024-11-07 DIAGNOSIS — F90.1 ATTENTION DEFICIT HYPERACTIVITY DISORDER (ADHD), PREDOMINANTLY HYPERACTIVE TYPE: ICD-10-CM

## 2024-11-07 NOTE — TELEPHONE ENCOUNTER
Caller: Agnes Burgos    Relationship: Self    Best call back number: [phone]     Requested Prescriptions:   Requested Prescriptions     Pending Prescriptions Disp Refills    methylphenidate (Ritalin) 10 MG tablet 60 tablet 0     Sig: Take 1 tablet by mouth 2 (Two) Times a Day for 30 days.        Pharmacy where request should be sent: [facility] #[id number] [facility] [address] AT [facility] [address] [place] - [phone]  - [phone] FX     Last office visit with prescribing clinician: 1/9/2024   Last telemedicine visit with prescribing clinician: Visit date not found   Next office visit with prescribing clinician: Visit date not found     Additional details provided by patient: 1 WEEK OF MEDICATION ON HAND    Does the patient have less than a 3 day supply:  [] Yes  [x] No    Would you like a call back once the refill request has been completed: [x] Yes [] No    If the office needs to give you a call back, can they leave a voicemail: [] Yes [] No    Stefan Pino Rep   11/07/24 13:54 EST

## 2024-11-08 RX ORDER — METHYLPHENIDATE HYDROCHLORIDE 10 MG/1
10 TABLET ORAL 2 TIMES DAILY
Qty: 60 TABLET | Refills: 0 | Status: SHIPPED | OUTPATIENT
Start: 2024-11-08 | End: 2024-12-08

## 2025-02-10 ENCOUNTER — TELEPHONE (OUTPATIENT)
Dept: OBSTETRICS AND GYNECOLOGY | Facility: CLINIC | Age: 25
End: 2025-02-10
Payer: COMMERCIAL

## 2025-02-10 DIAGNOSIS — F90.1 ATTENTION DEFICIT HYPERACTIVITY DISORDER (ADHD), PREDOMINANTLY HYPERACTIVE TYPE: ICD-10-CM

## 2025-02-10 RX ORDER — METHYLPHENIDATE HYDROCHLORIDE 10 MG/1
10 TABLET ORAL 2 TIMES DAILY
Qty: 60 TABLET | Refills: 0 | OUTPATIENT
Start: 2025-02-10 | End: 2025-03-12

## 2025-02-10 NOTE — TELEPHONE ENCOUNTER
Hasn't been seen in about 1 year, will defer to PCP for ongoing refills. He will be back in office this week.

## 2025-02-10 NOTE — TELEPHONE ENCOUNTER
Caller: Agnes Burgos    Relationship to patient: Self    Best call back number: [phone], OK TO LEAVE A VM       Patient is needing: BLOOD IN URINE, AS WELL AS BURNING WHEN URINATING. PLEASE ADVISE

## 2025-02-10 NOTE — TELEPHONE ENCOUNTER
Pt calls with bloody discharge or urine- isn't sure. She sees it on the toilet paper. Intermittent burning with urination, and urinary urgency. Has been present for over a month. Bleeding occurs occasionally after sex as well. Last office visit was 8/2023. Scheduled for annual exam, will discuss problems as well.

## 2025-02-10 NOTE — TELEPHONE ENCOUNTER
Caller: Agnes Burgos    Relationship: Self    Best call back number: [phone]    Requested Prescriptions:   Requested Prescriptions     Pending Prescriptions Disp Refills    methylphenidate (Ritalin) 10 MG tablet 60 tablet 0     Sig: Take 1 tablet by mouth 2 (Two) Times a Day for 30 days.        Pharmacy where request should be sent: [facility] #[id number] [facility] [address] AT [facility] [address] [place] - [phone]  - [phone] FX     Last office visit with prescribing clinician: 1/9/2024   Last telemedicine visit with prescribing clinician: Visit date not found   Next office visit with prescribing clinician: Visit date not found         Does the patient have less than a 3 day supply:  [x] Yes  [] No    Would you like a call back once the refill request has been completed: [] Yes [x] No    If the office needs to give you a call back, can they leave a voicemail: [] Yes [x] No    Stefan Carlin Rep   02/10/25 13:24 EST

## 2025-02-12 ENCOUNTER — OFFICE VISIT (OUTPATIENT)
Dept: OBSTETRICS AND GYNECOLOGY | Facility: CLINIC | Age: 25
End: 2025-02-12
Payer: COMMERCIAL

## 2025-02-12 VITALS
DIASTOLIC BLOOD PRESSURE: 64 MMHG | BODY MASS INDEX: 19.83 KG/M2 | SYSTOLIC BLOOD PRESSURE: 110 MMHG | WEIGHT: 123.4 LBS | HEIGHT: 66 IN

## 2025-02-12 DIAGNOSIS — R10.2 PELVIC PAIN: ICD-10-CM

## 2025-02-12 DIAGNOSIS — Z01.419 WOMEN'S ANNUAL ROUTINE GYNECOLOGICAL EXAMINATION: Primary | ICD-10-CM

## 2025-02-12 DIAGNOSIS — R30.0 DYSURIA: ICD-10-CM

## 2025-02-12 DIAGNOSIS — N94.10 FEMALE DYSPAREUNIA: ICD-10-CM

## 2025-02-12 DIAGNOSIS — Z20.2 POSSIBLE EXPOSURE TO STD: ICD-10-CM

## 2025-02-12 DIAGNOSIS — N92.6 IRREGULAR MENSES: ICD-10-CM

## 2025-02-12 DIAGNOSIS — Z12.4 SCREENING FOR CERVICAL CANCER: ICD-10-CM

## 2025-02-12 LAB
B-HCG UR QL: NEGATIVE
BILIRUB BLD-MCNC: NEGATIVE MG/DL
CLARITY, POC: CLEAR
COLOR UR: YELLOW
EXPIRATION DATE: NORMAL
GLUCOSE UR STRIP-MCNC: NEGATIVE MG/DL
INTERNAL NEGATIVE CONTROL: NORMAL
INTERNAL POSITIVE CONTROL: NORMAL
KETONES UR QL: NEGATIVE
LEUKOCYTE EST, POC: NEGATIVE
Lab: NORMAL
NITRITE UR-MCNC: NEGATIVE MG/ML
PH UR: 6 [PH] (ref 5–8)
PROT UR STRIP-MCNC: NEGATIVE MG/DL
RBC # UR STRIP: NEGATIVE /UL
SP GR UR: 1.02 (ref 1–1.03)
UROBILINOGEN UR QL: NORMAL

## 2025-02-12 RX ORDER — METHYLPHENIDATE HYDROCHLORIDE 10 MG/1
10 TABLET ORAL 2 TIMES DAILY
Qty: 60 TABLET | Refills: 0 | Status: SHIPPED | OUTPATIENT
Start: 2025-02-12 | End: 2025-03-14

## 2025-02-12 NOTE — PROGRESS NOTES
"     Gynecologic Annual Exam Note        Gynecologic Exam        Subjective     HPI  Agnes Burgos is a 24 y.o.  female who presents for annual well woman exam as a established patient. There were no changes to her medical or surgical history since her last visit.. Patient's last menstrual period was 2025 (approximate). Her periods occur every irregular, lasting 3-4 days.  Reports last month having 2 cycles. Then might be normal. The irregularity has been since October. Takes Spironolactone for acne. Not been taking as directed. The flow is day 1 is heaviest saturating a pad/tampon every hour that first day then tapers off. She reports dysmenorrhea is mild occurring first 1-2 days of flow. Marital Status: single.  She is sexually active. She has had new partners since last pap. STD testing recommendations have been explained to the patient and she does desire STD testing.     Patient stated that she was supposed to have a second repeat pap to clear her from where she was HPV pool +.     The patient would like to discuss the following complaints today: urinary urgency, slight burning sensation, and vaginal brownish discharge for approx 1.5 months.     Patient reports her symptoms for about 2 months. Patient states that \"this does not feel like a UTI\". She denies changes soaps and detergents.     Additional OB/GYN History   contraceptive methods: pull out method  Desires to: do not start contraception  Thromboembolic Disease: none  History of migraines: yes without aura  Age of menarche: 13    History of STD: yes  HPV    Last Pap : 08/15/2023. Results: negative. HPV POOL: negative.   Last Completed Pap Smear            Ordered - PAP SMEAR (Every 3 Years) Ordered on 2025      08/15/2023  LIQUID-BASED PAP SMEAR WITH HPV GENOTYPING REGARDLESS OF INTERPRETATION (BELKIS,COR,MAD)                     History of abnormal Pap smear: yes - 2021- HPV pool +  Gardasil status:declines  Family history of " "uterine, colon, breast, or ovarian cancer: no  Performs monthly Self-Breast Exam: yes  Exercises Regularly:yes  Feelings of Anxiety or Depression: no  Tobacco Usage?: No       Current Outpatient Medications:     spironolactone (ALDACTONE) 50 MG tablet, , Disp: , Rfl:     SUMAtriptan (IMITREX) 100 MG tablet, Take 1 tablet by mouth Every 2 (Two) Hours As Needed for Migraine. Take one tablet at onset of headache. May repeat dose one time in 2 hours if headache not relieved., Disp: 9 tablet, Rfl: 11    tretinoin (RETIN-A) 0.05 % cream, Apply 1 Application topically to the appropriate area as directed Every Night., Disp: , Rfl:     vitamin D (ERGOCALCIFEROL) 1.25 MG (76473 UT) capsule capsule, TAKE 1 CAPSULE BY MOUTH ONCE A WEEK FOR 12 DOSES. RECHECK VITAMIN \"D\" LEVEL WHEN FINISHED(LAB ORDER IN), Disp: 12 capsule, Rfl: 0    methylphenidate (Ritalin) 10 MG tablet, Take 1 tablet by mouth 2 (Two) Times a Day for 30 days., Disp: 60 tablet, Rfl: 0    methylphenidate (Ritalin) 10 MG tablet, Take 1 tablet by mouth 2 (Two) Times a Day for 30 days., Disp: 60 tablet, Rfl: 0     Patient denies the need for medication refills today.    OB History          0    Para   0    Term   0       0    AB   0    Living   0         SAB   0    IAB   0    Ectopic   0    Molar   0    Multiple   0    Live Births   0                Health Maintenance   Topic Date Due    HPV VACCINES (1 - 3-dose series) Never done    HEPATITIS C SCREENING  Never done    ANNUAL PHYSICAL  Never done    INFLUENZA VACCINE  2024    Annual Gynecologic Pelvic and Breast Exam  2024    COVID-19 Vaccine (3 - 2024- season) 2024    PAP SMEAR  08/15/2026    TDAP/TD VACCINES (2 - Td or Tdap) 2030    Pneumococcal Vaccine 0-49  Aged Out    CHLAMYDIA SCREENING  Discontinued       Past Medical History:   Diagnosis Date    Abnormal Pap smear of cervix     Depression     Epilepsy     Hx of migraine headaches     STD (female) 10/13/2021    " "Chlamydia, dx at [facility]        Past Surgical History:   Procedure Laterality Date    NASAL POLYP SURGERY         The additional following portions of the patient's history were reviewed and updated as appropriate: allergies, current medications, past family history, past medical history, past social history, and past surgical history.    Review of Systems   Respiratory: Negative.  Negative for shortness of breath.    Cardiovascular: Negative.  Negative for chest pain.   Gastrointestinal: Negative.  Negative for abdominal distention, abdominal pain and constipation.   Genitourinary:  Positive for dyspareunia (Deep pelvic. Approx 1 month.), dysuria, menstrual problem, urgency and vaginal discharge. Negative for breast discharge, breast lump, breast pain, frequency, pelvic pain, pelvic pressure, urinary incontinence, vaginal bleeding and vaginal pain.   Psychiatric/Behavioral: Negative.  Negative for depressed mood. The patient is not nervous/anxious.          I have reviewed and agree with the HPI, ROS, and historical information as entered above. Sabine Mcdonald, APRN          Objective   /64 (BP Location: Right arm, Patient Position: Sitting, Cuff Size: Adult)   Ht 167.6 cm (65.98\")   Wt 56 kg (123 lb 6.4 oz)   LMP 01/16/2025 (Approximate)   BMI 19.93 kg/m²     Physical Exam  Vitals and nursing note reviewed. Exam conducted with a chaperone present.   Constitutional:       General: She is not in acute distress.     Appearance: Normal appearance. She is well-developed. She is not ill-appearing or toxic-appearing.   HENT:      Head: Normocephalic and atraumatic.   Pulmonary:      Effort: Pulmonary effort is normal. No retractions.   Chest:      Chest wall: No mass.   Breasts:     Breasts are symmetrical.      Right: Normal. No mass, nipple discharge, skin change or tenderness.      Left: Normal. No mass, nipple discharge, skin change or tenderness.      Comments: Bilateral nipple piercing. "   Abdominal:      Palpations: Abdomen is soft. Abdomen is not rigid. There is no mass.      Tenderness: There is no abdominal tenderness. There is no guarding or rebound.      Hernia: No hernia is present. There is no hernia in the left inguinal area or right inguinal area.   Genitourinary:     General: Normal vulva.      Labia:         Right: No rash, tenderness or lesion.         Left: No rash, tenderness or lesion.       Vagina: Vaginal discharge present. No erythema, tenderness, bleeding or lesions.      Cervix: Discharge and friability present. No cervical motion tenderness, lesion, erythema or cervical bleeding.      Uterus: Normal. Not enlarged, not fixed and not tender.       Adnexa:         Right: Tenderness present. No mass or fullness.          Left: Tenderness present. No mass or fullness.        Rectum: No external hemorrhoid.      Comments: Small amt of tan mucoid d/c noted  Lymphadenopathy:      Upper Body:      Right upper body: No supraclavicular or axillary adenopathy.      Left upper body: No supraclavicular or axillary adenopathy.   Neurological:      Mental Status: She is alert and oriented to person, place, and time.   Psychiatric:         Mood and Affect: Mood normal.         Behavior: Behavior normal.            Assessment and Plan    Problem List Items Addressed This Visit    None  Visit Diagnoses       Women's annual routine gynecological examination    -  Primary    Relevant Orders    LIQUID-BASED PAP SMEAR WITH HPV GENOTYPING REGARDLESS OF INTERPRETATION (BELKIS,COR,MAD)    CBC & Differential    Comprehensive Metabolic Panel    TSH Rfx On Abnormal To Free T4    CBC (No Diff)    RPR, Rfx Qn RPR / Confirm TP    Hepatitis B Surface Antigen    Hepatitis C Antibody    HIV-1 / O / 2 Ag / Antibody 4th Generation    Possible exposure to STD        Relevant Orders    LIQUID-BASED PAP SMEAR WITH HPV GENOTYPING REGARDLESS OF INTERPRETATION (BELKIS,COR,MAD)    CBC & Differential    Comprehensive Metabolic  Panel    TSH Rfx On Abnormal To Free T4    CBC (No Diff)    RPR, Rfx Qn RPR / Confirm TP    Hepatitis B Surface Antigen    Hepatitis C Antibody    HIV-1 / O / 2 Ag / Antibody 4th Generation    Screening for cervical cancer        Relevant Orders    LIQUID-BASED PAP SMEAR WITH HPV GENOTYPING REGARDLESS OF INTERPRETATION (BELKIS,COR,MAD)    Irregular menses        Relevant Orders    TSH Rfx On Abnormal To Free T4    Testosterone - total    DHEA-Sulfate    Prolactin    FSH & LH    Estradiol    POC Pregnancy, Urine (Completed)    US Non-ob Transvaginal    Dysuria        Relevant Orders    POC Urinalysis Dipstick (Completed)    Pelvic pain        Relevant Orders    US Non-ob Transvaginal    Female dyspareunia                GYN annual well woman exam.   Reviewed pap guidelines. Pap pending w/ STI testing, BV, and yeast- Will treat if indicated.   STI blood panel pending.  Encouraged use of condoms for STD prevention and contraception.  Potential etiologies for irregular cycles reviewed with patient. Advised to take Spironolactone as directed. UPT Neg. Labs ordered- PCOS, STIs, Thyroid. Treatment options reviewed. She declined contraception for management.   Pelvic pain/dyspareunia etiologies reviewed with patient. CCUA negative. US ordered to r/o structural abnormalities. IBU, Tylenol, heat PRN pain. ED for severe pain   Reviewed monthly self breast exams.  Instructed to call with lumps, pain, or breast discharge.    Reviewed exercise as a preventative health measures.   Reccommended Flu Vaccine in Fall of each year.  Preventative care, dyspareunia, preventing STIs, and pelvic pain education included in patient instructions.   Return in about 1 month (around 3/12/2025) for U/S and appt for Pelvic pain.    Sabine Mcdonald, APRN  02/12/2025

## 2025-02-13 LAB
ALBUMIN SERPL-MCNC: 3.9 G/DL (ref 3.5–5.2)
ALBUMIN/GLOB SERPL: 1.3 G/DL
ALP SERPL-CCNC: 64 U/L (ref 39–117)
ALT SERPL-CCNC: 8 U/L (ref 1–33)
AST SERPL-CCNC: 17 U/L (ref 1–32)
BASOPHILS # BLD AUTO: 0.03 10*3/MM3 (ref 0–0.2)
BASOPHILS NFR BLD AUTO: 0.4 % (ref 0–1.5)
BILIRUB SERPL-MCNC: 0.3 MG/DL (ref 0–1.2)
BUN SERPL-MCNC: 12 MG/DL (ref 6–20)
BUN/CREAT SERPL: 15.6 (ref 7–25)
CALCIUM SERPL-MCNC: 9.1 MG/DL (ref 8.6–10.5)
CHLORIDE SERPL-SCNC: 102 MMOL/L (ref 98–107)
CO2 SERPL-SCNC: 24.5 MMOL/L (ref 22–29)
CREAT SERPL-MCNC: 0.77 MG/DL (ref 0.57–1)
DHEA-S SERPL-MCNC: 249 UG/DL (ref 110–431.7)
EGFRCR SERPLBLD CKD-EPI 2021: 110.6 ML/MIN/1.73
EOSINOPHIL # BLD AUTO: 0.28 10*3/MM3 (ref 0–0.4)
EOSINOPHIL NFR BLD AUTO: 3.9 % (ref 0.3–6.2)
ERYTHROCYTE [DISTWIDTH] IN BLOOD BY AUTOMATED COUNT: 12.5 % (ref 12.3–15.4)
ESTRADIOL SERPL-MCNC: 151 PG/ML
FSH SERPL-ACNC: 2.4 MIU/ML
GLOBULIN SER CALC-MCNC: 2.9 GM/DL
GLUCOSE SERPL-MCNC: 70 MG/DL (ref 65–99)
HBV SURFACE AG SERPL QL IA: NEGATIVE
HCT VFR BLD AUTO: 33.7 % (ref 34–46.6)
HCV IGG SERPL QL IA: NON REACTIVE
HGB BLD-MCNC: 11.2 G/DL (ref 12–15.9)
HIV 1+2 AB+HIV1 P24 AG SERPL QL IA: NON REACTIVE
IMM GRANULOCYTES # BLD AUTO: 0.02 10*3/MM3 (ref 0–0.05)
IMM GRANULOCYTES NFR BLD AUTO: 0.3 % (ref 0–0.5)
LH SERPL-ACNC: 3.3 MIU/ML
LYMPHOCYTES # BLD AUTO: 1.57 10*3/MM3 (ref 0.7–3.1)
LYMPHOCYTES NFR BLD AUTO: 21.9 % (ref 19.6–45.3)
MCH RBC QN AUTO: 28.3 PG (ref 26.6–33)
MCHC RBC AUTO-ENTMCNC: 33.2 G/DL (ref 31.5–35.7)
MCV RBC AUTO: 85.1 FL (ref 79–97)
MONOCYTES # BLD AUTO: 0.58 10*3/MM3 (ref 0.1–0.9)
MONOCYTES NFR BLD AUTO: 8.1 % (ref 5–12)
NEUTROPHILS # BLD AUTO: 4.68 10*3/MM3 (ref 1.7–7)
NEUTROPHILS NFR BLD AUTO: 65.4 % (ref 42.7–76)
NRBC BLD AUTO-RTO: 0 /100 WBC (ref 0–0.2)
PLATELET # BLD AUTO: 287 10*3/MM3 (ref 140–450)
POTASSIUM SERPL-SCNC: 3.9 MMOL/L (ref 3.5–5.2)
PROLACTIN SERPL-MCNC: 16.2 NG/ML (ref 4.8–33.4)
PROT SERPL-MCNC: 6.8 G/DL (ref 6–8.5)
RBC # BLD AUTO: 3.96 10*6/MM3 (ref 3.77–5.28)
RPR SER QL: NON REACTIVE
SODIUM SERPL-SCNC: 136 MMOL/L (ref 136–145)
TESTOST SERPL-MCNC: 36 NG/DL (ref 13–71)
TSH SERPL DL<=0.005 MIU/L-ACNC: 2.66 UIU/ML (ref 0.27–4.2)
WBC # BLD AUTO: 7.16 10*3/MM3 (ref 3.4–10.8)

## 2025-02-14 ENCOUNTER — TELEPHONE (OUTPATIENT)
Dept: OBSTETRICS AND GYNECOLOGY | Facility: CLINIC | Age: 25
End: 2025-02-14
Payer: COMMERCIAL

## 2025-02-14 DIAGNOSIS — A74.9 CHLAMYDIA: Primary | ICD-10-CM

## 2025-02-14 RX ORDER — DOXYCYCLINE 100 MG/1
100 CAPSULE ORAL 2 TIMES DAILY
Qty: 14 CAPSULE | Refills: 0 | Status: SHIPPED | OUTPATIENT
Start: 2025-02-14 | End: 2025-02-21

## 2025-02-14 NOTE — TELEPHONE ENCOUNTER
Caller: Agnes Burgos  Female, 24 y.o., 2000  MRN: [id number]  CSN: [id number]  Phone: [phone]    Relationship: SELF        What is the best time to reach you: ANY    Who are you requesting to speak with (clinical staff, provider,  specific staff member): PROVIDER    Do you know the name of the person who called: QUYEN CHEATHAM    What was the call regarding: UNKNOWN    Is it okay if the provider responds through MyChart: PT REQ A CALL BACK, UNABLE TO WARM TRANSFER

## 2025-02-14 NOTE — TELEPHONE ENCOUNTER
Patient notified of lab results & NF recommendations. Pt v/u & patient already had appt scheduled next month.

## 2025-02-14 NOTE — PROGRESS NOTES
LVM to CB. Patient tested + for Chlamydia. Pap still pending. Please notify pt. Doxy 100mg BID x 7 days prescribed. No unprotected IC during tx and 2 weeks post last dose. Partner should be treated as well. Sexually transmitted. Educate pt. Schedule ANTHONY in 1 month.

## 2025-02-17 LAB — REF LAB TEST METHOD: NORMAL

## 2025-02-18 DIAGNOSIS — B37.31 VAGINAL YEAST INFECTION: Primary | ICD-10-CM

## 2025-02-18 RX ORDER — FLUCONAZOLE 150 MG/1
TABLET ORAL
Qty: 3 TABLET | Refills: 0 | Status: SHIPPED | OUTPATIENT
Start: 2025-02-18

## 2025-03-24 ENCOUNTER — OFFICE VISIT (OUTPATIENT)
Dept: OBSTETRICS AND GYNECOLOGY | Facility: CLINIC | Age: 25
End: 2025-03-24
Payer: COMMERCIAL

## 2025-03-24 VITALS — BODY MASS INDEX: 19.54 KG/M2 | SYSTOLIC BLOOD PRESSURE: 102 MMHG | DIASTOLIC BLOOD PRESSURE: 66 MMHG | WEIGHT: 121 LBS

## 2025-03-24 DIAGNOSIS — N89.8 VAGINAL DISCHARGE: ICD-10-CM

## 2025-03-24 DIAGNOSIS — A74.9 CHLAMYDIA: Primary | ICD-10-CM

## 2025-03-24 PROCEDURE — 99213 OFFICE O/P EST LOW 20 MIN: CPT

## 2025-03-24 NOTE — PROGRESS NOTES
Chief Complaint   Patient presents with    Follow-up       Subjective   HPI  Agnes Burgos is a 24 y.o. female, . Patient's last menstrual period was 2025 (exact date).. who presents for follow up on irregular periods and test of cure- Chlamydia.      Patient was last seen 25, she reported irregular periods since October. She reported some months cycles are normal and some months she will have 2 periods. At that time she also reported taking Spironolactone incorrectly.     Patient also tested positive for Chlamydia on pap. She was treated with Doxy. She reports she an partner received treatment. She admits to unprotected IC since treatment. She states IC was after partner was cleared.    Plan was to start taking Spironolactone regularly and follow up in office in 1 month with US. Since then she reports her cycle has improved.     US was done today. Nml pelvic US. I have reviewed the preliminary US report. Final report pending physician review. Sabine Mcdonald, APRN        Additional OB/GYN History     Last Pap :   Last Completed Pap Smear            Upcoming       PAP SMEAR (Every 3 Years) Next due on 2025  LIQUID-BASED PAP SMEAR WITH HPV GENOTYPING REGARDLESS OF INTERPRETATION (BELKIS,COR,MAD)    08/15/2023  LIQUID-BASED PAP SMEAR WITH HPV GENOTYPING REGARDLESS OF INTERPRETATION (BELKIS,COR,MAD)                            Last mammogram:   Last Completed Mammogram    This patient has no relevant Health Maintenance data.         Tobacco Usage?: No   OB History          0    Para   0    Term   0       0    AB   0    Living   0         SAB   0    IAB   0    Ectopic   0    Molar   0    Multiple   0    Live Births   0                  Current Outpatient Medications:     spironolactone (ALDACTONE) 50 MG tablet, , Disp: , Rfl:     SUMAtriptan (IMITREX) 100 MG tablet, Take 1 tablet by mouth Every 2 (Two) Hours As Needed for Migraine. Take one tablet at onset  "of headache. May repeat dose one time in 2 hours if headache not relieved., Disp: 9 tablet, Rfl: 11    tretinoin (RETIN-A) 0.05 % cream, Apply 1 Application topically to the appropriate area as directed Every Night., Disp: , Rfl:     vitamin D (ERGOCALCIFEROL) 1.25 MG (69324 UT) capsule capsule, TAKE 1 CAPSULE BY MOUTH ONCE A WEEK FOR 12 DOSES. RECHECK VITAMIN \"D\" LEVEL WHEN FINISHED(LAB ORDER IN), Disp: 12 capsule, Rfl: 0    methylphenidate (Ritalin) 10 MG tablet, Take 1 tablet by mouth 2 (Two) Times a Day for 30 days. Appt needed, Disp: 60 tablet, Rfl: 0     Past Medical History:   Diagnosis Date    Abnormal Pap smear of cervix     Depression     Epilepsy     Hx of migraine headaches     STD (female) 10/13/2021    Chlamydia, dx at [facility]        Past Surgical History:   Procedure Laterality Date    NASAL POLYP SURGERY         The additional following portions of the patient's history were reviewed and updated as appropriate: allergies, current medications, past family history, past medical history, past social history, past surgical history, and problem list.    Review of Systems   Respiratory: Negative.  Negative for shortness of breath.    Cardiovascular: Negative.  Negative for chest pain.   Gastrointestinal: Negative.  Negative for abdominal distention, abdominal pain and constipation.   Genitourinary:  Negative for dyspareunia, dysuria, menstrual problem, pelvic pain, pelvic pressure, urinary incontinence, vaginal bleeding, vaginal discharge and vaginal pain.       I have reviewed and agree with the HPI, ROS, and historical information as entered above. Sabine Mcdonald, APRN      Objective   /66   Wt 54.9 kg (121 lb)   LMP 03/14/2025 (Exact Date)   BMI 19.54 kg/m²     Physical Exam  Vitals and nursing note reviewed. Exam conducted with a chaperone present.   Constitutional:       General: She is not in acute distress.     Appearance: Normal appearance. She is not ill-appearing or " toxic-appearing.   HENT:      Head: Normocephalic and atraumatic.   Pulmonary:      Effort: Pulmonary effort is normal.   Abdominal:      Palpations: Abdomen is soft. There is no mass.      Tenderness: There is no abdominal tenderness.      Hernia: No hernia is present.   Genitourinary:     General: Normal vulva.      Labia:         Right: No rash, tenderness, lesion or injury.         Left: No rash, tenderness, lesion or injury.       Vagina: No signs of injury. Vaginal discharge present. No erythema, tenderness, bleeding or lesions.      Cervix: Discharge present. No cervical motion tenderness, friability, lesion, erythema or cervical bleeding.      Uterus: Normal. Not enlarged and not tender.       Adnexa: Right adnexa normal and left adnexa normal.        Right: No mass or tenderness.          Left: No mass or tenderness.        Comments: Thick white creamy d/c mixed with clear watery d/c   Neurological:      Mental Status: She is alert and oriented to person, place, and time.   Psychiatric:         Mood and Affect: Mood normal.         Behavior: Behavior normal.         Assessment & Plan     Assessment     Problem List Items Addressed This Visit    None  Visit Diagnoses         Chlamydia    -  Primary    Relevant Orders    NuSwab VG+ - Swab, Vagina      Vaginal discharge        Relevant Orders    NuSwab VG+ - Swab, Vagina          Plan:     Test of cure- Chlamydia.   Vaginal discharge. Asymptomatic. Nuswab pending. Will treat if indicated.   Encouraged condom use to prevent STIs.   Vaginitis precautions reviewed.   Vaginitis and preventing STI education included in patient instructions.   Return for Annual physical or sooner if needed.        Sabine Mcdonald, APRN  03/24/2025

## 2025-03-27 LAB
A VAGINAE DNA VAG QL NAA+PROBE: NORMAL SCORE
BVAB2 DNA VAG QL NAA+PROBE: NORMAL SCORE
C ALBICANS DNA VAG QL NAA+PROBE: NEGATIVE
C GLABRATA DNA VAG QL NAA+PROBE: NEGATIVE
C TRACH DNA SPEC QL NAA+PROBE: NEGATIVE
MEGA1 DNA VAG QL NAA+PROBE: NORMAL SCORE
N GONORRHOEA DNA VAG QL NAA+PROBE: NEGATIVE
T VAGINALIS DNA VAG QL NAA+PROBE: NEGATIVE

## 2025-04-17 DIAGNOSIS — F90.1 ATTENTION DEFICIT HYPERACTIVITY DISORDER (ADHD), PREDOMINANTLY HYPERACTIVE TYPE: ICD-10-CM

## 2025-04-17 NOTE — TELEPHONE ENCOUNTER
Caller: Agnes Burgos    Relationship: Self    Best call back number: [phone]     Requested Prescriptions:   Requested Prescriptions     Pending Prescriptions Disp Refills    methylphenidate (Ritalin) 10 MG tablet 60 tablet 0     Sig: Take 1 tablet by mouth 2 (Two) Times a Day for 30 days. Appt needed        Pharmacy where request should be sent: [facility] #[id number] [facility] [address] AT [facility] [address] [place] - [phone]  - [phone] FX     Last office visit with prescribing clinician: 1/9/2024   Last telemedicine visit with prescribing clinician: Visit date not found   Next office visit with prescribing clinician: Visit date not found         Does the patient have less than a 3 day supply:  [] Yes  [x] No    Would you like a call back once the refill request has been completed: [] Yes [x] No    If the office needs to give you a call back, can they leave a voicemail: [] Yes [x] No    Stefan Carlin Rep   04/17/25 16:16 EDT

## 2025-04-18 RX ORDER — METHYLPHENIDATE HYDROCHLORIDE 10 MG/1
10 TABLET ORAL 2 TIMES DAILY
Qty: 60 TABLET | Refills: 0 | OUTPATIENT
Start: 2025-04-18 | End: 2025-05-18

## 2025-04-18 NOTE — TELEPHONE ENCOUNTER
Rx Refill Note  Requested Prescriptions     Pending Prescriptions Disp Refills    methylphenidate (Ritalin) 10 MG tablet 60 tablet 0     Sig: Take 1 tablet by mouth 2 (Two) Times a Day for 30 days. Appt needed      Last office visit with prescribing clinician: 1/9/2024   Next office visit with prescribing clinician:    CSA:4/18/23  UDS:NONE      Caty Jacobs MA  04/18/25, 14:09 EDT

## 2025-05-20 ENCOUNTER — LAB (OUTPATIENT)
Dept: LAB | Facility: HOSPITAL | Age: 25
End: 2025-05-20
Payer: COMMERCIAL

## 2025-05-20 ENCOUNTER — OFFICE VISIT (OUTPATIENT)
Dept: FAMILY MEDICINE CLINIC | Facility: CLINIC | Age: 25
End: 2025-05-20
Payer: COMMERCIAL

## 2025-05-20 VITALS
DIASTOLIC BLOOD PRESSURE: 62 MMHG | HEART RATE: 78 BPM | WEIGHT: 122 LBS | OXYGEN SATURATION: 98 % | BODY MASS INDEX: 19.61 KG/M2 | HEIGHT: 66 IN | SYSTOLIC BLOOD PRESSURE: 104 MMHG

## 2025-05-20 DIAGNOSIS — F90.1 ATTENTION DEFICIT HYPERACTIVITY DISORDER (ADHD), PREDOMINANTLY HYPERACTIVE TYPE: Primary | ICD-10-CM

## 2025-05-20 DIAGNOSIS — E55.9 VITAMIN D DEFICIENCY: ICD-10-CM

## 2025-05-20 DIAGNOSIS — E53.8 BIOTIN DEFICIENCY: ICD-10-CM

## 2025-05-20 LAB — 25(OH)D3 SERPL-MCNC: 29.9 NG/ML (ref 30–100)

## 2025-05-20 PROCEDURE — 99214 OFFICE O/P EST MOD 30 MIN: CPT | Performed by: FAMILY MEDICINE

## 2025-05-20 PROCEDURE — 84591 ASSAY OF NOS VITAMIN: CPT

## 2025-05-20 PROCEDURE — 82306 VITAMIN D 25 HYDROXY: CPT

## 2025-05-20 RX ORDER — ERGOCALCIFEROL 1.25 MG/1
50000 CAPSULE, LIQUID FILLED ORAL
Qty: 12 CAPSULE | Refills: 4 | Status: SHIPPED | OUTPATIENT
Start: 2025-05-20

## 2025-05-20 RX ORDER — METHYLPHENIDATE HYDROCHLORIDE 10 MG/1
10 TABLET ORAL 2 TIMES DAILY
Qty: 60 TABLET | Refills: 0 | Status: SHIPPED | OUTPATIENT
Start: 2025-05-20

## 2025-05-20 NOTE — PROGRESS NOTES
Established Patient Office Visit      Patient Name: Agnes Burgos  : 2000   MRN: [id number]   Care Team: Patient Care Team:  Rickey Bunn DO as PCP - General (Family Medicine)    Chief Complaint:    Chief Complaint   Patient presents with    ADHD     Medication follow up CSA and UDS       History of Present Illness: Agnes Burgos is a 24 y.o. female who is here today for routine controlled substance monitoring.      Routine  Pertinent negative symptoms include no abdominal pain, no anorexia, no joint pain, no change in stool, no chest pain, no chills, no congestion, no cough, no diaphoresis, no fatigue, no fever, no headaches, no joint swelling, no myalgias, no nausea, no neck pain, no numbness, no rash, no sore throat, no swollen glands, no dysuria, no vertigo, no visual change, no vomiting and no weakness.       Agnes Burgospresents for follow-up on ADHD.  Due to being on a long-term controlled substance/stimulant, they are seen every 3 months for monitoring and reevaluation however she has not been seen due to her work schedule since 2024. she reports that her medication is doing well at the current dosage. she denies any new side effects including unintentional weight loss, anorexia, heart palpitations, or increased anxiety.  They feel that they are still getting substantial benefit from active treatment.    The following portions of the patient's history were reviewed and updated as appropriate: allergies, current medications, past family history, past medical history, past social history, past surgical history and problem list.    Subjective      Review of Systems:   Review of Systems   Constitutional:  Negative for chills, diaphoresis, fatigue and fever.   HENT:  Negative for congestion, sore throat and swollen glands.    Respiratory:  Negative for cough.    Cardiovascular:  Negative for chest pain.   Gastrointestinal:  Negative for abdominal pain, anorexia, nausea and vomiting.    Genitourinary:  Negative for dysuria.   Musculoskeletal:  Negative for joint pain, myalgias and neck pain.   Skin:  Negative for rash.   Neurological:  Negative for vertigo, weakness and numbness.    - See HPI    Past Medical History:   Past Medical History:   Diagnosis Date    Abnormal Pap smear of cervix     ADHD (attention deficit hyperactivity disorder)     Anxiety     Chlamydia     Depression     Epilepsy     HPV (human papilloma virus) infection     Hx of migraine headaches     STD (female) 10/13/2021    Chlamydia, dx at [facility]       Past Surgical History:   Past Surgical History:   Procedure Laterality Date    NASAL POLYP SURGERY         Family History:   Family History   Problem Relation Age of Onset    Thyroid disease Mother     Diabetes Father     Cancer Maternal Aunt     Cancer Paternal Uncle     Cancer Maternal Grandmother     Breast cancer Neg Hx     Ovarian cancer Neg Hx     Uterine cancer Neg Hx     Colon cancer Neg Hx        Social History:   Social History     Socioeconomic History    Marital status: Single   Tobacco Use    Smoking status: Never     Passive exposure: Never    Smokeless tobacco: Never   Vaping Use    Vaping status: Every Day    Substances: Nicotine, Flavoring    Devices: Disposable, Pre-filled pod   Substance and Sexual Activity    Alcohol use: Yes     Comment: 5    Drug use: Never    Sexual activity: Yes     Partners: Male     Birth control/protection: None, Natural family planning/Rhythm       Tobacco History:   Social History     Tobacco Use   Smoking Status Never    Passive exposure: Never   Smokeless Tobacco Never       Medications:     Current Outpatient Medications:     methylphenidate (Ritalin) 10 MG tablet, Take 1 tablet by mouth 2 (Two) Times a Day., Disp: 60 tablet, Rfl: 0    spironolactone (ALDACTONE) 50 MG tablet, , Disp: , Rfl:     SUMAtriptan (IMITREX) 100 MG tablet, Take 1 tablet by mouth Every 2 (Two) Hours As Needed for Migraine. Take one tablet at  "onset of headache. May repeat dose one time in 2 hours if headache not relieved., Disp: 9 tablet, Rfl: 11    tretinoin (RETIN-A) 0.05 % cream, Apply 1 Application topically to the appropriate area as directed Every Night., Disp: , Rfl:     vitamin D (ERGOCALCIFEROL) 1.25 MG (19801 UT) capsule capsule, Take 1 capsule by mouth Every 7 (Seven) Days., Disp: 12 capsule, Rfl: 4    Allergies:   No Known Allergies    Objective   Objective     Physical Exam:  Vital Signs:   Vitals:    05/20/25 0841   BP: 104/62   Pulse: 78   SpO2: 98%   Weight: 55.3 kg (122 lb)   Height: 167.6 cm (65.98\")     Body mass index is 19.7 kg/m².     Physical Exam  Nursing note reviewed  Const: NAD, A&Ox4, Pleasant, Cooperative  Eyes: EOMI, no conjunctivitis  ENT: No nasal discharge present, neck supple  Cardiac: Regular rate and rhythm, no cyanosis  Psych: Appropriate mood and behavior.  PHQ-9 Depression Screening           Assessment & Plan   Assessment / Plan      Assessment/Plan:   Problems Addressed This Visit  Diagnoses and all orders for this visit:    1. Attention deficit hyperactivity disorder (ADHD), predominantly hyperactive type (Primary)  Assessment & Plan:  She has done well on the Ritalin.  Sometimes she takes both tablets in the morning, sometimes she splits up to 1 tablet twice per day depending on her schedule.  Controlled substance agreement and urine drug screen updated today.  Of note, [place] will be moving to [place] within the next few months, we discussed plans to transition her care down there.  Will be happy to send records if needed, otherwise she will plan to do a video visit the month before she moves whenever she is due for her refill and I will plan to send in a 90-day supply at that time so she will have enough of a buffer.    Orders:  -     Cancel: Compliance Drug Analysis, Ur - Urine, Clean Catch; Future  -     Compliance Drug Analysis, Ur - Urine, Clean Catch; Future  -     Compliance Drug Analysis, Ur - Urine, " Clean Catch  -     methylphenidate (Ritalin) 10 MG tablet; Take 1 tablet by mouth 2 (Two) Times a Day.  Dispense: 60 tablet; Refill: 0    2. Vitamin D deficiency  Assessment & Plan:  Has been hit or miss with supplement.  Recommend rechecking, refill sent to pharmacy for 50,000 units weekly.  She would rather a weekly supplement than the daily.    Orders:  -     vitamin D (ERGOCALCIFEROL) 1.25 MG (72945 UT) capsule capsule; Take 1 capsule by mouth Every 7 (Seven) Days.  Dispense: 12 capsule; Refill: 4  -     Vitamin D,25-Hydroxy; Future    3. Biotin deficiency  -     Vitamin B7 (Biotin); Future      Problem List Items Addressed This Visit          Endocrine and Metabolic    Vitamin D deficiency    Overview   Mild         Current Assessment & Plan   Has been hit or miss with supplement.  Recommend rechecking, refill sent to pharmacy for 50,000 units weekly.  She would rather a weekly supplement than the daily.         Relevant Medications    vitamin D (ERGOCALCIFEROL) 1.25 MG (55245 UT) capsule capsule    Other Relevant Orders    Vitamin D,25-Hydroxy       Mental Health    Attention deficit hyperactivity disorder (ADHD), predominantly hyperactive type - Primary    Overview   Guanfacine minimally effective, did mostly well on adderall until needing to switch to ritalin in January 2024. Has done well since then.         Current Assessment & Plan   She has done well on the Ritalin.  Sometimes she takes both tablets in the morning, sometimes she splits up to 1 tablet twice per day depending on her schedule.  Controlled substance agreement and urine drug screen updated today.  Of note, she will be moving to [place] within the next few months, we discussed plans to transition her care down there.  Will be happy to send records if needed, otherwise she will plan to do a video visit the month before she moves whenever she is due for her refill and I will plan to send in a 90-day supply at that time so she will have  enough of a buffer.         Relevant Medications    methylphenidate (Ritalin) 10 MG tablet    Other Relevant Orders    Compliance Drug Analysis, Ur - Urine, Clean Catch     Other Visit Diagnoses         Biotin deficiency        Relevant Orders    Vitamin B7 (Biotin)            Chart reviewed for controlled substance agreement and most recent urine drug screen.  Urine drug screen completed today.  CECY/PDMP reviewed and appropriate. No signs of abuse or diversion, no new concerns from patient.  Refills provided as indicated or previously completed.  -Anxiety rating scale (HAM-A), PHQ-9, and adult ADHD self-report scale updated and reviewed yearly  -Patient counseled on adjuvant nonpharmacologic treatments for ADHD including exercise, low sugar diet, Rhodiola supplement OTC, CBT/behavioral health referral, meditation, and adequate sleep.    Patient Instructions   We will plan to do a 90-day supply for the ritalin before you move and we'll have to figure out what to do from there. It doesn't look like I can prescribe controlled substances into [place].    Follow Up:   Return in about 3 months (around 8/20/2025) for video visit, Routine controlled substance monitoring.    DO MICA Guerrero RD  [facility]  [address]  [facility] [phone]  Fax [phone]  Phone [phone]

## 2025-05-20 NOTE — ASSESSMENT & PLAN NOTE
She has done well on the Ritalin.  Sometimes she takes both tablets in the morning, sometimes she splits up to 1 tablet twice per day depending on her schedule.  Controlled substance agreement and urine drug screen updated today.  Of note, she will be moving to [place] within the next few months, we discussed plans to transition her care down there.  Will be happy to send records if needed, otherwise she will plan to do a video visit the month before she moves whenever she is due for her refill and I will plan to send in a 90-day supply at that time so she will have enough of a buffer.

## 2025-05-20 NOTE — PATIENT INSTRUCTIONS
We will plan to do a 90-day supply for the ritalin before you move and we'll have to figure out what to do from there. It doesn't look like I can prescribe controlled substances into [place].

## 2025-05-20 NOTE — ASSESSMENT & PLAN NOTE
Has been hit or miss with supplement.  Recommend rechecking, refill sent to pharmacy for 50,000 units weekly.  She would rather a weekly supplement than the daily.

## 2025-05-28 LAB — DRUGS UR: NORMAL

## 2025-06-01 LAB — BIOTIN SERPL-MCNC: <0.05 NG/ML (ref 0.05–0.83)

## 2025-06-24 DIAGNOSIS — F90.1 ATTENTION DEFICIT HYPERACTIVITY DISORDER (ADHD), PREDOMINANTLY HYPERACTIVE TYPE: ICD-10-CM

## 2025-06-24 RX ORDER — METHYLPHENIDATE HYDROCHLORIDE 10 MG/1
10 TABLET ORAL 2 TIMES DAILY
Qty: 60 TABLET | Refills: 0 | Status: SHIPPED | OUTPATIENT
Start: 2025-06-24

## 2025-06-24 NOTE — TELEPHONE ENCOUNTER
Caller: Agnes Burgos    Relationship: Self    Best call back number: [phone]     Requested Prescriptions:   Requested Prescriptions     Pending Prescriptions Disp Refills    methylphenidate (Ritalin) 10 MG tablet 60 tablet 0     Sig: Take 1 tablet by mouth 2 (Two) Times a Day.        Pharmacy where request should be sent: [id number] - [facility] [address] AT [facility] [address] - [phone]  - [phone] FX     Last office visit with prescribing clinician: 5/20/2025   Last telemedicine visit with prescribing clinician: Visit date not found   Next office visit with prescribing clinician: 7/28/2025         Does the patient have less than a 3 day supply:  [] Yes  [x] No    Would you like a call back once the refill request has been completed: [] Yes [x] No    If the office needs to give you a call back, can they leave a voicemail: [] Yes [x] No    Stefan Carlin Rep   06/24/25 10:37 EDT

## 2025-07-28 ENCOUNTER — OFFICE VISIT (OUTPATIENT)
Dept: FAMILY MEDICINE CLINIC | Facility: CLINIC | Age: 25
End: 2025-07-28
Payer: COMMERCIAL

## 2025-07-28 VITALS
BODY MASS INDEX: 21.12 KG/M2 | HEART RATE: 94 BPM | DIASTOLIC BLOOD PRESSURE: 64 MMHG | OXYGEN SATURATION: 100 % | HEIGHT: 66 IN | SYSTOLIC BLOOD PRESSURE: 100 MMHG | WEIGHT: 131.4 LBS

## 2025-07-28 DIAGNOSIS — F90.1 ATTENTION DEFICIT HYPERACTIVITY DISORDER (ADHD), PREDOMINANTLY HYPERACTIVE TYPE: Primary | ICD-10-CM

## 2025-07-28 RX ORDER — DOXYCYCLINE 100 MG/1
100 CAPSULE ORAL 2 TIMES DAILY WITH MEALS
COMMUNITY
Start: 2025-05-30

## 2025-07-28 RX ORDER — METHYLPHENIDATE HYDROCHLORIDE 10 MG/1
10 TABLET ORAL 2 TIMES DAILY
Qty: 180 TABLET | Refills: 0 | Status: SHIPPED | OUTPATIENT
Start: 2025-07-28